# Patient Record
Sex: FEMALE | Race: WHITE | NOT HISPANIC OR LATINO | Employment: OTHER | URBAN - NONMETROPOLITAN AREA
[De-identification: names, ages, dates, MRNs, and addresses within clinical notes are randomized per-mention and may not be internally consistent; named-entity substitution may affect disease eponyms.]

---

## 2022-07-07 ENCOUNTER — TELEPHONE (OUTPATIENT)
Dept: VASCULAR SURGERY | Facility: CLINIC | Age: 67
End: 2022-07-07

## 2022-07-07 NOTE — TELEPHONE ENCOUNTER
PT STATED THAT SHE DOES NOT NEED TO [facility]PT WITH [facility] AND I STATED TO THE PT IF SHE NEEDS US TO JUST CALL THE OFFICE BACK

## 2025-05-14 ENCOUNTER — OFFICE VISIT (OUTPATIENT)
Age: 70
End: 2025-05-14
Payer: MEDICARE

## 2025-05-14 VITALS — HEIGHT: 68 IN | BODY MASS INDEX: 30.43 KG/M2 | WEIGHT: 200.8 LBS

## 2025-05-14 DIAGNOSIS — M25.561 CHRONIC PAIN OF RIGHT KNEE: Primary | ICD-10-CM

## 2025-05-14 DIAGNOSIS — M17.11 PRIMARY OSTEOARTHRITIS OF ONE KNEE, RIGHT: ICD-10-CM

## 2025-05-14 DIAGNOSIS — G89.29 CHRONIC PAIN OF RIGHT KNEE: Primary | ICD-10-CM

## 2025-05-14 PROCEDURE — 1036F TOBACCO NON-USER: CPT | Performed by: PHYSICIAN ASSISTANT

## 2025-05-14 PROCEDURE — 3017F COLORECTAL CA SCREEN DOC REV: CPT | Performed by: PHYSICIAN ASSISTANT

## 2025-05-14 PROCEDURE — G8419 CALC BMI OUT NRM PARAM NOF/U: HCPCS | Performed by: PHYSICIAN ASSISTANT

## 2025-05-14 PROCEDURE — 1123F ACP DISCUSS/DSCN MKR DOCD: CPT | Performed by: PHYSICIAN ASSISTANT

## 2025-05-14 PROCEDURE — G8400 PT W/DXA NO RESULTS DOC: HCPCS | Performed by: PHYSICIAN ASSISTANT

## 2025-05-14 PROCEDURE — 99202 OFFICE O/P NEW SF 15 MIN: CPT | Performed by: PHYSICIAN ASSISTANT

## 2025-05-14 PROCEDURE — G8427 DOCREV CUR MEDS BY ELIG CLIN: HCPCS | Performed by: PHYSICIAN ASSISTANT

## 2025-05-14 PROCEDURE — 1090F PRES/ABSN URINE INCON ASSESS: CPT | Performed by: PHYSICIAN ASSISTANT

## 2025-05-14 PROCEDURE — 1160F RVW MEDS BY RX/DR IN RCRD: CPT | Performed by: PHYSICIAN ASSISTANT

## 2025-05-14 PROCEDURE — 1159F MED LIST DOCD IN RCRD: CPT | Performed by: PHYSICIAN ASSISTANT

## 2025-05-14 RX ORDER — LISINOPRIL 20 MG/1
20 TABLET ORAL DAILY
COMMUNITY

## 2025-05-14 RX ORDER — ALPRAZOLAM 1 MG/1
1 TABLET ORAL 3 TIMES DAILY PRN
COMMUNITY

## 2025-05-14 RX ORDER — SPIRONOLACTONE 25 MG/1
25 TABLET ORAL 2 TIMES DAILY
COMMUNITY

## 2025-05-14 RX ORDER — ERGOCALCIFEROL 1.25 MG/1
CAPSULE, LIQUID FILLED ORAL DAILY
COMMUNITY

## 2025-05-14 NOTE — PROGRESS NOTES
Orthopaedic History and Physical - New Patient    NAME:  Jenny Hutchison   : 1955  MRN: [id number]      2025     CHIEF COMPLAINT:    Chief Complaint   Patient presents with    Right Knee: Pain for Years / no prev surg / no injury        HISTORY OF PRESENT ILLNESS:   The patient is a 70 y.o. female who presents to the office for evaluation and treatment of right knee pain.   Pain began several years ago and was associated without a traumatic event. Pain is described as GS PAIN CHARACTER: sharp and aching, associated with Associated symptoms: weakness and swelling worse with Pain worse when: climbing stairs, standing and walking. Treatment has consisted of tylenol.  Pain is rated a  2-3 /10 and located on the medial right knee.  She is here today with her .  She ambulates without assistance.    Past Medical History:        Diagnosis Date    Anxiety     Hypertension        Past Surgical History:        Procedure Laterality Date    BREAST REDUCTION SURGERY Bilateral     GALLBLADDER SURGERY      HYSTEROTOMY         Current Medications:   Prior to Admission medications    Medication Sig Start Date End Date Taking? Authorizing Provider   vitamin D (ERGOCALCIFEROL) 1.25 MG (02582 UT) CAPS capsule Take by mouth daily   Yes Joseph Corbett MD   ALPRAZolam (XANAX) 1 MG tablet Take 1 tablet by mouth 3 times daily as needed for Anxiety.   Yes Joseph Corbett MD   lisinopril (PRINIVIL;ZESTRIL) 20 MG tablet Take 1 tablet by mouth daily   Yes Joseph Corbett MD   spironolactone (ALDACTONE) 25 MG tablet Take 1 tablet by mouth 2 times daily   Yes Joseph Corbett MD   Omega-3 Fatty Acids (FISH OIL) 300 MG CAPS Take by mouth   Yes Joseph Corbett MD       Allergies:  Sulfur    Social History:   Social History     Socioeconomic History    Marital status: Unknown     Spouse name: Not on file    Number of children: Not on file    Years of education: Not on file    Highest education level: